# Patient Record
Sex: MALE | Race: WHITE | NOT HISPANIC OR LATINO | Employment: FULL TIME | URBAN - NONMETROPOLITAN AREA
[De-identification: names, ages, dates, MRNs, and addresses within clinical notes are randomized per-mention and may not be internally consistent; named-entity substitution may affect disease eponyms.]

---

## 2020-03-15 PROCEDURE — 87661 TRICHOMONAS VAGINALIS AMPLIF: CPT | Performed by: NURSE PRACTITIONER

## 2020-03-15 PROCEDURE — 87591 N.GONORRHOEAE DNA AMP PROB: CPT | Performed by: NURSE PRACTITIONER

## 2020-03-15 PROCEDURE — 87491 CHLMYD TRACH DNA AMP PROBE: CPT | Performed by: NURSE PRACTITIONER

## 2021-05-18 ENCOUNTER — APPOINTMENT (OUTPATIENT)
Dept: GENERAL RADIOLOGY | Facility: HOSPITAL | Age: 21
End: 2021-05-18

## 2021-05-18 ENCOUNTER — HOSPITAL ENCOUNTER (EMERGENCY)
Facility: HOSPITAL | Age: 21
Discharge: HOME | End: 2021-05-18
Attending: EMERGENCY MEDICINE | Admitting: EMERGENCY MEDICINE

## 2021-05-18 VITALS
OXYGEN SATURATION: 99 % | HEART RATE: 94 BPM | WEIGHT: 150 LBS | RESPIRATION RATE: 17 BRPM | BODY MASS INDEX: 21 KG/M2 | TEMPERATURE: 97.8 F | HEIGHT: 71 IN | DIASTOLIC BLOOD PRESSURE: 73 MMHG | SYSTOLIC BLOOD PRESSURE: 115 MMHG

## 2021-05-18 DIAGNOSIS — S43.015A ANTERIOR DISLOCATION OF LEFT SHOULDER, INITIAL ENCOUNTER: Primary | ICD-10-CM

## 2021-05-18 PROCEDURE — 73030 X-RAY EXAM OF SHOULDER: CPT

## 2021-05-18 PROCEDURE — 25010000002 ONDANSETRON PER 1 MG: Performed by: EMERGENCY MEDICINE

## 2021-05-18 PROCEDURE — 96374 THER/PROPH/DIAG INJ IV PUSH: CPT

## 2021-05-18 PROCEDURE — 99284 EMERGENCY DEPT VISIT MOD MDM: CPT

## 2021-05-18 PROCEDURE — 96376 TX/PRO/DX INJ SAME DRUG ADON: CPT

## 2021-05-18 PROCEDURE — 73020 X-RAY EXAM OF SHOULDER: CPT

## 2021-05-18 PROCEDURE — 99152 MOD SED SAME PHYS/QHP 5/>YRS: CPT

## 2021-05-18 PROCEDURE — 96375 TX/PRO/DX INJ NEW DRUG ADDON: CPT

## 2021-05-18 PROCEDURE — 25010000002 LORAZEPAM PER 2 MG: Performed by: EMERGENCY MEDICINE

## 2021-05-18 PROCEDURE — 25010000002 KETOROLAC TROMETHAMINE PER 15 MG: Performed by: EMERGENCY MEDICINE

## 2021-05-18 RX ORDER — KETAMINE HYDROCHLORIDE 100 MG/ML
1 INJECTION INTRAMUSCULAR; INTRAVENOUS ONCE
Status: COMPLETED | OUTPATIENT
Start: 2021-05-18 | End: 2021-05-18

## 2021-05-18 RX ORDER — KETAMINE HYDROCHLORIDE 100 MG/ML
30 INJECTION INTRAMUSCULAR; INTRAVENOUS ONCE
Status: COMPLETED | OUTPATIENT
Start: 2021-05-18 | End: 2021-05-18

## 2021-05-18 RX ORDER — ONDANSETRON 2 MG/ML
4 INJECTION INTRAMUSCULAR; INTRAVENOUS ONCE
Status: COMPLETED | OUTPATIENT
Start: 2021-05-18 | End: 2021-05-18

## 2021-05-18 RX ORDER — NAPROXEN 500 MG/1
500 TABLET ORAL 2 TIMES DAILY PRN
Qty: 10 TABLET | Refills: 0 | Status: SHIPPED | OUTPATIENT
Start: 2021-05-18

## 2021-05-18 RX ORDER — LORAZEPAM 2 MG/ML
1 INJECTION INTRAMUSCULAR ONCE
Status: COMPLETED | OUTPATIENT
Start: 2021-05-18 | End: 2021-05-18

## 2021-05-18 RX ORDER — KETOROLAC TROMETHAMINE 30 MG/ML
30 INJECTION, SOLUTION INTRAMUSCULAR; INTRAVENOUS ONCE
Status: COMPLETED | OUTPATIENT
Start: 2021-05-18 | End: 2021-05-18

## 2021-05-18 RX ORDER — SODIUM CHLORIDE 0.9 % (FLUSH) 0.9 %
10 SYRINGE (ML) INJECTION AS NEEDED
Status: DISCONTINUED | OUTPATIENT
Start: 2021-05-18 | End: 2021-05-18 | Stop reason: HOSPADM

## 2021-05-18 RX ADMIN — LORAZEPAM 1 MG: 2 INJECTION INTRAMUSCULAR; INTRAVENOUS at 02:10

## 2021-05-18 RX ADMIN — KETAMINE HYDROCHLORIDE 68 MG: 100 INJECTION, SOLUTION, CONCENTRATE INTRAMUSCULAR; INTRAVENOUS at 02:04

## 2021-05-18 RX ADMIN — SODIUM CHLORIDE 1000 ML: 9 INJECTION, SOLUTION INTRAVENOUS at 02:00

## 2021-05-18 RX ADMIN — KETAMINE HYDROCHLORIDE 30 MG: 100 INJECTION INTRAMUSCULAR; INTRAVENOUS at 02:10

## 2021-05-18 RX ADMIN — ONDANSETRON 4 MG: 2 INJECTION INTRAMUSCULAR; INTRAVENOUS at 02:00

## 2021-05-18 RX ADMIN — KETOROLAC TROMETHAMINE 30 MG: 30 INJECTION, SOLUTION INTRAMUSCULAR at 02:00

## 2021-05-18 RX ADMIN — LORAZEPAM 1 MG: 2 INJECTION INTRAMUSCULAR; INTRAVENOUS at 02:01

## 2021-05-20 ENCOUNTER — OFFICE VISIT (OUTPATIENT)
Dept: ORTHOPEDIC SURGERY | Facility: CLINIC | Age: 21
End: 2021-05-20

## 2021-05-20 VITALS
HEART RATE: 82 BPM | DIASTOLIC BLOOD PRESSURE: 80 MMHG | SYSTOLIC BLOOD PRESSURE: 122 MMHG | HEIGHT: 71 IN | BODY MASS INDEX: 21.67 KG/M2 | WEIGHT: 154.8 LBS | OXYGEN SATURATION: 100 %

## 2021-05-20 DIAGNOSIS — S43.015A ANTERIOR DISLOCATION OF LEFT SHOULDER, INITIAL ENCOUNTER: ICD-10-CM
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
## 2021-05-20 DIAGNOSIS — M25.512 ACUTE PAIN OF LEFT SHOULDER: Primary | ICD-10-CM

## 2021-05-20 PROCEDURE — 99214 OFFICE O/P EST MOD 30 MIN: CPT | Performed by: NURSE PRACTITIONER

## 2021-05-20 NOTE — PROGRESS NOTES
Sotero Hunt is a 20 y.o. male   Primary provider:  Provider, No Known       Chief Complaint   Patient presents with   • Left Shoulder - Follow-up     HISTORY OF PRESENT ILLNESS:     20-year-old male patient presents to office for evaluation of acute left shoulder pain/injury with dislocation.  Initial injury occurred on 5/18/2021 when he fell while standing on his bed trying to put on a duvet cover and struck his left arm on the closet door.  Patient had immediate onset of pain and deformity of the left shoulder.  Patient was treated at  ED for an anterior dislocation of his left shoulder.  Patient underwent closed reduction successfully in the ED and was placed in a sling for immobilization.  Patient was prescribed naproxen for pain.  Patient continues to wear his sling to the left arm.  Pain is described as intermittent and moderate in severity.  Pain is described as aching and stabbing in nature with associated popping sensations and swelling. Pain is worse with movement of his left shoulder.  Pain improves with rest, immobilization with a sling, ice therapy and anti-inflammatory medications.  Pain scale today is 2/10.    Pain  This is a new problem. The current episode started in the past 7 days (5/18/2021). The problem occurs intermittently. The problem has been gradually improving. Associated symptoms include arthralgias, joint swelling and myalgias. Pertinent negatives include no numbness. Associated symptoms comments: Aching, stabbing pain; popping sensations, swelling. The symptoms are aggravated by exertion (Movement/use of left arm/shoulder). He has tried rest, NSAIDs, ice and immobilization (Sling) for the symptoms. The treatment provided moderate relief.     CONCURRENT MEDICAL HISTORY:    No past medical history on file.    No Known Allergies      Current Outpatient Medications:   •  naproxen (NAPROSYN) 500 MG tablet, Take 1 tablet by mouth 2 (Two) Times a Day As Needed for Moderate Pain   "for up to 10 doses., Disp: 10 tablet, Rfl: 0    No past surgical history on file.    No family history on file.     Social History     Socioeconomic History   • Marital status: Single     Spouse name: Not on file   • Number of children: Not on file   • Years of education: Not on file   • Highest education level: Not on file   Tobacco Use   • Smoking status: Never Smoker   • Smokeless tobacco: Never Used   Substance and Sexual Activity   • Alcohol use: No   • Drug use: No   • Sexual activity: Defer        Review of Systems   Constitutional: Positive for activity change.   HENT: Negative.    Eyes: Negative.    Respiratory: Negative.    Cardiovascular: Negative.    Gastrointestinal: Negative.    Endocrine: Negative.    Genitourinary: Negative.    Musculoskeletal: Positive for arthralgias, joint swelling and myalgias.        Left shoulder pain.   Skin: Negative.    Allergic/Immunologic: Negative.    Neurological: Negative.  Negative for numbness.   Hematological: Negative.    Psychiatric/Behavioral: Positive for sleep disturbance.       PHYSICAL EXAMINATION:       /80   Pulse 82   Ht 180.3 cm (71\")   Wt 70.2 kg (154 lb 12.8 oz)   SpO2 100%   BMI 21.59 kg/m²     Physical Exam  Vitals reviewed.   Constitutional:       General: He is not in acute distress.     Appearance: He is well-developed. He is not ill-appearing.   HENT:      Head: Normocephalic.   Pulmonary:      Effort: Pulmonary effort is normal. No respiratory distress.   Abdominal:      General: There is no distension.      Palpations: Abdomen is soft.   Musculoskeletal:         General: Tenderness (Mild, left shoulder) present. No swelling or deformity.   Skin:     General: Skin is warm and dry.      Capillary Refill: Capillary refill takes less than 2 seconds.      Findings: No erythema.   Neurological:      Mental Status: He is alert and oriented to person, place, and time.      GCS: GCS eye subscore is 4. GCS verbal subscore is 5. GCS motor " subscore is 6.   Psychiatric:         Speech: Speech normal.         Behavior: Behavior normal.         Thought Content: Thought content normal.         Judgment: Judgment normal.         GAIT:     [x]  Normal  []  Antalgic    Assistive device: [x]  None  []  Walker     []  Crutches  []  Cane     []  Wheelchair  []  Stretcher    Right Shoulder Exam     Tenderness   The patient is experiencing no tenderness.    Range of Motion   The patient has normal right shoulder ROM.    Muscle Strength   The patient has normal right shoulder strength.    Other   Erythema: absent  Sensation: normal  Pulse: present      Left Shoulder Exam     Tenderness   Left shoulder tenderness location: Mild, diffuse.    Muscle Strength   Left shoulder normal muscle strength: deferred.    Other   Erythema: absent  Sensation: normal  Pulse: present     Comments:  Range of motion and strength assessments are deferred due to recent dislocation and closed reduction.  Mild, diffuse tenderness to palpation.  No swelling appreciated.  No erythema.  No ecchymosis.  Neurovascularly intact.            XR Shoulder 1 View Left    Result Date: 5/18/2021  Narrative: EXAM:   XR Left Shoulder, 1 View CLINICAL HISTORY:   The patient is 20 years old and is Male; post-reduction TECHNIQUE:   One view of the left shoulder. COMPARISON:   No relevant prior studies available. FINDINGS:   BONES/JOINTS:  Interval successful reduction of the previously demonstrated anterior shoulder dislocation.  No acute fracture.   SOFT TISSUES:  Unremarkable.     Impression:   Interval successful reduction of the previously demonstrated anterior shoulder dislocation. Electronically signed by:  Johana Jama MD  5/18/2021 2:31 AM CDT Workstation: [id number]    XR Shoulder 2+ View Left    Result Date: 5/18/2021  Narrative: EXAM:   XR Left Shoulder Complete, 2 or More Views CLINICAL HISTORY:   The patient is 20 years old and is Male; fall TECHNIQUE:   Two or more views of the left  shoulder. COMPARISON:   No relevant prior studies available. FINDINGS:   BONES/JOINTS:  Anterior shoulder dislocation is noted.  No acute fracture.   SOFT TISSUES:  Unremarkable.   LUNGS:  The visualized lung is clear.     Impression:   Left anterior shoulder dislocation. Electronically signed by:  Johana Jama MD  5/18/2021 2:03 AM CDT Workstation: [id number]    ASSESSMENT:    Diagnoses and all orders for this visit:    Acute pain of left shoulder    Anterior dislocation of left shoulder, initial encounter    Injury due to fall, initial encounter    PLAN    X-rays of the left shoulder performed in the ED on 5/18/2021 are independently reviewed by myself today.  Patient underwent successful closed reduction of the left shoulder in the ED on that date.  Patient has continued to wear the sling for immobilization.  Overall, patient is doing well and states his pain is mild and well controlled.  We discussed his injury and potential concurrent injury such as labral tears and rotator cuff tears.  We discussed possible need for MRI imaging if his left shoulder pain persists over the next 2 to 4 weeks.  For now, recommend continue with use of the sling for immobilization.  I have recommended that he utilize the sling for the next 2 weeks but we did discuss that he can remove his arm from the sling and perform some gentle range of motion exercises as tolerated and also perform some pendulum exercises as tolerated.  Anticipate trying to transition out of the sling in the next 2 weeks and this will be based on his pain.  Recommend ice therapy to the left shoulder intermittently 3-4 times daily for 15 minutes at a time to minimize pain/inflammation/swelling.  Recommend to continue with naproxen twice daily as prescribed in the ED.  Patient can also take Tylenol as needed for additional pain control.    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  Patient wants to try to return to work with  restrictions.  I have provided him with a return to work note with the restrictions of no active use with the left arm for now and he needs to keep his sling in place.  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  If they cannot accommodate the restrictions given, patient will need to be off work.  Follow-up in 2 weeks for recheck.  If pain persist, consider MRI imaging of the left shoulder for further evaluation.    EMR Dragon/Transciption Disclaimer: Some of this note may be an electronic transcription/translation of spoken language to printed text.  The electronic translation of spoken language may permit erroneous, or at times, nonsensical words or phrases to be inadvertently transcribed. Although I have reviewed the note for such errors, some may still exist.     Return in about 2 weeks (around 6/3/2021) for Recheck.        This document has been electronically signed by RACH Helms on May 20, 2021 15:31 CDT      RACH Helms

## 2021-06-03 ENCOUNTER — OFFICE VISIT (OUTPATIENT)
Dept: ORTHOPEDIC SURGERY | Facility: CLINIC | Age: 21
End: 2021-06-03

## 2021-06-03 VITALS — BODY MASS INDEX: 21.7 KG/M2 | WEIGHT: 155 LBS | HEIGHT: 71 IN

## 2021-06-03 DIAGNOSIS — S43.015D ANTERIOR DISLOCATION OF LEFT SHOULDER, SUBSEQUENT ENCOUNTER: ICD-10-CM
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
## 2021-06-03 DIAGNOSIS — M25.512 ACUTE PAIN OF LEFT SHOULDER: Primary | ICD-10-CM

## 2021-06-03 PROCEDURE — 99213 OFFICE O/P EST LOW 20 MIN: CPT | Performed by: NURSE PRACTITIONER

## 2021-06-03 NOTE — PROGRESS NOTES
"Sotero Hunt is a 20 y.o. male returns for     Chief Complaint   Patient presents with   • Left Shoulder - Follow-up       HISTORY OF PRESENT ILLNESS: Patient presents to office for follow-up of acute left shoulder pain/injury resulting in shoulder dislocation that required a closed reduction in the ED.  Initial injury occurred on 5/18/2021 when he fell while standing on his bed trying to put on a duvet cover and struck his left arm on the closet door.  Patient has continue with use of his sling since last office visit.  Patient was able to return to work with restrictions and use of his sling.  Patient reports his left shoulder pain has progressively improved.  Patient denies any instability symptoms.  No new complaints or concerns noted since last office visit.  No new falls or injuries reported.  Pain scale today 0/10.     CONCURRENT MEDICAL HISTORY:    The following portions of the patient's history were reviewed and updated as appropriate: allergies, current medications, past family history, past medical history, past social history, past surgical history and problem list.     ROS  No fevers or chills.  No chest pain or shortness of air.  No GI or  disturbances.    PHYSICAL EXAMINATION:       Ht 180.3 cm (71\")   Wt 70.3 kg (155 lb)   BMI 21.62 kg/m²     Physical Exam  Vitals reviewed.   Constitutional:       General: He is not in acute distress.     Appearance: He is well-developed. He is not ill-appearing.   HENT:      Head: Normocephalic.   Pulmonary:      Effort: Pulmonary effort is normal. No respiratory distress.   Abdominal:      General: There is no distension.      Palpations: Abdomen is soft.   Musculoskeletal:         General: No swelling, tenderness, deformity or signs of injury.   Skin:     General: Skin is warm and dry.      Capillary Refill: Capillary refill takes less than 2 seconds.      Findings: No erythema.   Neurological:      Mental Status: He is alert and oriented to person, " place, and time.      GCS: GCS eye subscore is 4. GCS verbal subscore is 5. GCS motor subscore is 6.   Psychiatric:         Speech: Speech normal.         Behavior: Behavior normal.         Thought Content: Thought content normal.         Judgment: Judgment normal.         GAIT:     [x]  Normal  []  Antalgic    Assistive device: [x]  None  []  Walker     []  Crutches  []  Cane     []  Wheelchair  []  Stretcher    Right Shoulder Exam     Tenderness   The patient is experiencing no tenderness.    Range of Motion   The patient has normal right shoulder ROM.    Muscle Strength   The patient has normal right shoulder strength.    Other   Erythema: absent  Sensation: normal  Pulse: present      Left Shoulder Exam     Tenderness   The patient is experiencing no tenderness.     Range of Motion   Active abduction: 150   Extension: 40   External rotation: 70   Forward flexion: 160   Internal rotation 90 degrees: 70     Muscle Strength   Left shoulder normal muscle strength: deferred.    Other   Erythema: absent  Sensation: normal  Pulse: present     Comments:  Mild pain and limitations with range of motion but overall good motion of the shoulder joint.  Pain with range of motion is mild.  No instability symptoms with range of motion.  No tenderness to palpation.  No swelling appreciated.  No erythema. No ecchymosis.  Neurovascularly intact.            XR Shoulder 1 View Left    Result Date: 5/18/2021  Narrative: EXAM:   XR Left Shoulder, 1 View CLINICAL HISTORY:   The patient is 20 years old and is Male; post-reduction TECHNIQUE:   One view of the left shoulder. COMPARISON:   No relevant prior studies available. FINDINGS:   BONES/JOINTS:  Interval successful reduction of the previously demonstrated anterior shoulder dislocation.  No acute fracture.   SOFT TISSUES:  Unremarkable.     Impression:   Interval successful reduction of the previously demonstrated anterior shoulder dislocation. Electronically signed by:  Johana  Wiley LUO  5/18/2021 2:31 AM CDT Workstation: [id number]    XR Shoulder 2+ View Left    Result Date: 5/18/2021  Narrative: EXAM:   XR Left Shoulder Complete, 2 or More Views CLINICAL HISTORY:   The patient is 20 years old and is Male; fall TECHNIQUE:   Two or more views of the left shoulder. COMPARISON:   No relevant prior studies available. FINDINGS:   BONES/JOINTS:  Anterior shoulder dislocation is noted.  No acute fracture.   SOFT TISSUES:  Unremarkable.   LUNGS:  The visualized lung is clear.     Impression:   Left anterior shoulder dislocation. Electronically signed by:  Johana Jama MD  5/18/2021 2:03 AM CDT Workstation: [id number]        ASSESSMENT:    Diagnoses and all orders for this visit:    Acute pain of left shoulder    Anterior dislocation of left shoulder, subsequent encounter    Injury due to fall, subsequent encounter    PLAN    Patient is doing well since last office visit and has continued to progressively improve.  He has no new symptoms and has not noticed any instability symptoms when he is moving and using his left arm.  Recommend transitioning out of the sling and gradually increasing the use of his left arm.  Patient has some mild limitations in range of motion but overall good motion of the shoulder with minimal pain.  We again discussed the possibilities of potential concurrent injuries due to his fall and shoulder dislocation such as labral tears and rotator cuff tears.  We discussed possible need for MRI imaging of the left shoulder if he experiences any instability symptoms, weakness or persistent pain.  Patient is encouraged to notify me if he is experiencing any of the symptoms so we can proceed with MRI imaging.  For now, he seems to be progressing appropriately and is not having any issues.  We discussed gradual return to normal activity but I have encouraged him to avoid exertional activity and heavy lifting for the next 2 weeks and to focus on range of motion and lighter  activity only.  Continue with use of ice therapy to left shoulder as needed to minimize pain/inflammation.  Recommend Tylenol, Aleve or Ibuprofen as needed for pain/discomfort.  Follow-up in 4 weeks for recheck as needed for any new, worsening or persistent symptoms.  If his pain/symptoms resolve, he can follow-up on an as-needed basis.    EMR Dragon/Transciption Disclaimer: Some of this note may be an electronic transcription/translation of spoken language to printed text.  The electronic translation of spoken language may permit erroneous, or at times, nonsensical words or phrases to be inadvertently transcribed. Although I have reviewed the note for such errors, some may still exist.     Return in about 4 weeks (around 7/1/2021), or if symptoms worsen or fail to improve, for Recheck.        This document has been electronically signed by RACH Helms on Gini 3, 2021 13:18 CDT      RACH Helms